# Patient Record
Sex: FEMALE | Race: WHITE | NOT HISPANIC OR LATINO | Employment: OTHER | URBAN - NONMETROPOLITAN AREA
[De-identification: names, ages, dates, MRNs, and addresses within clinical notes are randomized per-mention and may not be internally consistent; named-entity substitution may affect disease eponyms.]

---

## 2023-08-02 ENCOUNTER — OFFICE VISIT (OUTPATIENT)
Dept: PRIMARY CARE | Facility: CLINIC | Age: 73
End: 2023-08-02
Payer: MEDICARE

## 2023-08-02 VITALS
HEIGHT: 65 IN | BODY MASS INDEX: 30.82 KG/M2 | TEMPERATURE: 97 F | DIASTOLIC BLOOD PRESSURE: 72 MMHG | HEART RATE: 116 BPM | WEIGHT: 185 LBS | SYSTOLIC BLOOD PRESSURE: 126 MMHG | OXYGEN SATURATION: 98 %

## 2023-08-02 DIAGNOSIS — Z00.00 ROUTINE GENERAL MEDICAL EXAMINATION AT HEALTH CARE FACILITY: Primary | ICD-10-CM

## 2023-08-02 DIAGNOSIS — E03.9 HYPOTHYROIDISM, UNSPECIFIED TYPE: ICD-10-CM

## 2023-08-02 PROCEDURE — 1170F FXNL STATUS ASSESSED: CPT | Performed by: FAMILY MEDICINE

## 2023-08-02 PROCEDURE — 84443 ASSAY THYROID STIM HORMONE: CPT

## 2023-08-02 PROCEDURE — 1036F TOBACCO NON-USER: CPT | Performed by: FAMILY MEDICINE

## 2023-08-02 PROCEDURE — G0439 PPPS, SUBSEQ VISIT: HCPCS | Performed by: FAMILY MEDICINE

## 2023-08-02 PROCEDURE — 1159F MED LIST DOCD IN RCRD: CPT | Performed by: FAMILY MEDICINE

## 2023-08-02 ASSESSMENT — ACTIVITIES OF DAILY LIVING (ADL)
MANAGING_FINANCES: INDEPENDENT
BATHING: INDEPENDENT
GROCERY_SHOPPING: INDEPENDENT
TAKING_MEDICATION: INDEPENDENT
DOING_HOUSEWORK: INDEPENDENT
DRESSING: INDEPENDENT

## 2023-08-02 ASSESSMENT — ENCOUNTER SYMPTOMS
LOSS OF SENSATION IN FEET: 0
CONSTIPATION: 0
JOINT SWELLING: 0
SORE THROAT: 0
DEPRESSION: 0
RHINORRHEA: 0
EYE ITCHING: 0
OCCASIONAL FEELINGS OF UNSTEADINESS: 0
ABDOMINAL PAIN: 0
NUMBNESS: 0
VOMITING: 0
WHEEZING: 0
BLOOD IN STOOL: 0
DIARRHEA: 0
FLANK PAIN: 0
UNEXPECTED WEIGHT CHANGE: 0
EYE DISCHARGE: 0
PALPITATIONS: 0
MYALGIAS: 0
COUGH: 0
DYSURIA: 0
LIGHT-HEADEDNESS: 0
DYSPHORIC MOOD: 0
SLEEP DISTURBANCE: 0
APPETITE CHANGE: 0
NERVOUS/ANXIOUS: 0
DIZZINESS: 0
HEADACHES: 0
SINUS PRESSURE: 0
SHORTNESS OF BREATH: 0
NAUSEA: 0
ARTHRALGIAS: 0
HEMATURIA: 0
ACTIVITY CHANGE: 0
WEAKNESS: 0
FEVER: 0

## 2023-08-02 ASSESSMENT — PATIENT HEALTH QUESTIONNAIRE - PHQ9
SUM OF ALL RESPONSES TO PHQ9 QUESTIONS 1 AND 2: 0
1. LITTLE INTEREST OR PLEASURE IN DOING THINGS: NOT AT ALL
2. FEELING DOWN, DEPRESSED OR HOPELESS: NOT AT ALL

## 2023-08-02 ASSESSMENT — VISUAL ACUITY
OS_CC: 20/40
OD_CC: 20/20

## 2023-08-02 NOTE — PROGRESS NOTES
"Subjective   Reason for Visit: Joan M. Shoenberger is an 72 y.o. female here for a Medicare Wellness visit.     Past Medical, Surgical, and Family History reviewed and updated in chart.    Reviewed all medications by prescribing practitioner or clinical pharmacist (such as prescriptions, OTCs, herbal therapies and supplements) and documented in the medical record.    HPI FEELS WELL MOST DAYS     Patient Care Team:  Dahlia Silva DO as PCP - General (Family Medicine)  Yaya Herzog MD as PCP - [employer] PCP     Review of Systems   Constitutional:  Negative for activity change, appetite change, fever and unexpected weight change.   HENT:  Negative for congestion, ear pain, postnasal drip, rhinorrhea, sinus pressure and sore throat.    Eyes:  Negative for discharge, itching and visual disturbance.   Respiratory:  Negative for cough, shortness of breath and wheezing.    Cardiovascular:  Negative for chest pain, palpitations and leg swelling.   Gastrointestinal:  Negative for abdominal pain, blood in stool, constipation, diarrhea, nausea and vomiting.   Endocrine: Negative for cold intolerance, heat intolerance and polyuria.   Genitourinary:  Negative for dysuria, flank pain and hematuria.   Musculoskeletal:  Negative for arthralgias, gait problem, joint swelling and myalgias.   Skin:  Negative for rash.   Allergic/Immunologic: Negative for environmental allergies and food allergies.   Neurological:  Negative for dizziness, syncope, weakness, light-headedness, numbness and headaches.   Psychiatric/Behavioral:  Negative for dysphoric mood and sleep disturbance. The patient is not nervous/anxious.        Objective   Vitals:  /72   Pulse (!) 116   Temp 36.1 °C (97 °F)   Ht 1.638 m (5' 4.5\")   Wt 83.9 kg (185 lb)   SpO2 98%   BMI 31.26 kg/m²       Physical Exam  Constitutional:       Appearance: Normal appearance.   HENT:      Head: Normocephalic and atraumatic.      Nose: Nose normal.      " Mouth/Throat:      Mouth: Mucous membranes are moist.   Eyes:      Extraocular Movements: Extraocular movements intact.      Pupils: Pupils are equal, round, and reactive to light.   Cardiovascular:      Rate and Rhythm: Normal rate and regular rhythm.   Pulmonary:      Effort: Pulmonary effort is normal.      Breath sounds: Normal breath sounds.   Abdominal:      Palpations: Abdomen is soft.   Musculoskeletal:         General: Normal range of motion.      Cervical back: Normal range of motion and neck supple.   Skin:     General: Skin is warm and dry.   Neurological:      General: No focal deficit present.      Mental Status: She is alert and oriented to person, place, and time.   Psychiatric:         Mood and Affect: Mood normal.         Behavior: Behavior normal.         Assessment/Plan   Problem List Items Addressed This Visit    None  Visit Diagnoses       Routine general medical examination at health care facility    -  Primary    Hypothyroidism, unspecified type        Relevant Orders    Tsh With Reflex To Free T4 If Abnormal

## 2023-08-02 NOTE — PATIENT INSTRUCTIONS
Get the flu vaccine and the pneumonia vaccine this September   Labs taken and the office will call

## 2023-08-02 NOTE — PROGRESS NOTES
"Subjective   Patient ID: Joan M. Shoenberger is a 72 y.o. female who presents for Medicare Annual Wellness Visit Subsequent (Lab review).    HPI     Review of Systems    Objective   /72   Pulse (!) 116   Temp 36.1 °C (97 °F)   Ht 1.638 m (5' 4.5\")   Wt 83.9 kg (185 lb)   SpO2 98%   BMI 31.26 kg/m²     Physical Exam    Assessment/Plan          "

## 2023-08-03 ENCOUNTER — TELEPHONE (OUTPATIENT)
Dept: PRIMARY CARE | Facility: CLINIC | Age: 73
End: 2023-08-03
Payer: MEDICARE

## 2023-08-03 LAB — THYROTROPIN (MIU/L) IN SER/PLAS BY DETECTION LIMIT <= 0.05 MIU/L: 0.7 MIU/L (ref 0.44–3.98)

## 2023-08-03 NOTE — TELEPHONE ENCOUNTER
Fatimah has left a VM stating that she is returning a call.  I have left a message requesting a call back to the office

## 2023-08-03 NOTE — TELEPHONE ENCOUNTER
----- Message from Dahlia Silva DO sent at 8/3/2023 11:33 AM EDT -----  Thyroid tests are normal in range   Refill her medication

## 2023-08-17 DIAGNOSIS — Z86.39 PERSONAL HISTORY OF OTHER ENDOCRINE, NUTRITIONAL AND METABOLIC DISEASE: ICD-10-CM

## 2023-08-17 RX ORDER — LEVOTHYROXINE SODIUM 75 UG/1
75 TABLET ORAL DAILY
Qty: 90 TABLET | Refills: 3 | Status: SHIPPED | OUTPATIENT
Start: 2023-08-17

## 2023-09-06 ENCOUNTER — APPOINTMENT (OUTPATIENT)
Dept: PRIMARY CARE | Facility: CLINIC | Age: 73
End: 2023-09-06
Payer: MEDICARE

## 2023-12-09 ENCOUNTER — HOSPITAL ENCOUNTER (EMERGENCY)
Facility: HOSPITAL | Age: 73
Discharge: HOME | End: 2023-12-09
Attending: EMERGENCY MEDICINE
Payer: MEDICARE

## 2023-12-09 ENCOUNTER — APPOINTMENT (OUTPATIENT)
Dept: RADIOLOGY | Facility: HOSPITAL | Age: 73
End: 2023-12-09
Payer: MEDICARE

## 2023-12-09 VITALS
HEART RATE: 83 BPM | TEMPERATURE: 97.7 F | BODY MASS INDEX: 27 KG/M2 | WEIGHT: 168 LBS | SYSTOLIC BLOOD PRESSURE: 143 MMHG | HEIGHT: 66 IN | RESPIRATION RATE: 16 BRPM | DIASTOLIC BLOOD PRESSURE: 72 MMHG | OXYGEN SATURATION: 98 %

## 2023-12-09 DIAGNOSIS — N20.1 URETERAL STONE: ICD-10-CM

## 2023-12-09 DIAGNOSIS — N39.0 UTI (URINARY TRACT INFECTION), UNCOMPLICATED: Primary | ICD-10-CM

## 2023-12-09 LAB
ALBUMIN SERPL BCP-MCNC: 3.7 G/DL (ref 3.4–5)
ALP SERPL-CCNC: 60 U/L (ref 33–136)
ALT SERPL W P-5'-P-CCNC: 12 U/L (ref 7–45)
ANION GAP SERPL CALC-SCNC: 10 MMOL/L (ref 10–20)
APPEARANCE UR: CLEAR
AST SERPL W P-5'-P-CCNC: 18 U/L (ref 9–39)
BASOPHILS # BLD AUTO: 0.05 X10*3/UL (ref 0–0.1)
BASOPHILS NFR BLD AUTO: 0.7 %
BILIRUB DIRECT SERPL-MCNC: 0 MG/DL (ref 0–0.3)
BILIRUB SERPL-MCNC: 0.2 MG/DL (ref 0–1.2)
BILIRUB UR STRIP.AUTO-MCNC: NEGATIVE MG/DL
BUN SERPL-MCNC: 24 MG/DL (ref 6–23)
CALCIUM SERPL-MCNC: 8.8 MG/DL (ref 8.6–10.3)
CHLORIDE SERPL-SCNC: 104 MMOL/L (ref 98–107)
CO2 SERPL-SCNC: 26 MMOL/L (ref 21–32)
COLOR UR: YELLOW
CREAT SERPL-MCNC: 0.97 MG/DL (ref 0.5–1.05)
EOSINOPHIL # BLD AUTO: 0.15 X10*3/UL (ref 0–0.4)
EOSINOPHIL NFR BLD AUTO: 2.1 %
ERYTHROCYTE [DISTWIDTH] IN BLOOD BY AUTOMATED COUNT: 12.5 % (ref 11.5–14.5)
GFR SERPL CREATININE-BSD FRML MDRD: 61 ML/MIN/1.73M*2
GLUCOSE SERPL-MCNC: 118 MG/DL (ref 74–99)
GLUCOSE UR STRIP.AUTO-MCNC: NEGATIVE MG/DL
HCT VFR BLD AUTO: 39.8 % (ref 36–46)
HGB BLD-MCNC: 13.1 G/DL (ref 12–16)
IMM GRANULOCYTES # BLD AUTO: 0.02 X10*3/UL (ref 0–0.5)
IMM GRANULOCYTES NFR BLD AUTO: 0.3 % (ref 0–0.9)
INR PPP: 1.1 (ref 0.9–1.1)
KETONES UR STRIP.AUTO-MCNC: NEGATIVE MG/DL
LACTATE SERPL-SCNC: 1.1 MMOL/L (ref 0.4–2)
LEUKOCYTE ESTERASE UR QL STRIP.AUTO: ABNORMAL
LYMPHOCYTES # BLD AUTO: 2.37 X10*3/UL (ref 0.8–3)
LYMPHOCYTES NFR BLD AUTO: 32.8 %
MCH RBC QN AUTO: 31.5 PG (ref 26–34)
MCHC RBC AUTO-ENTMCNC: 32.9 G/DL (ref 32–36)
MCV RBC AUTO: 96 FL (ref 80–100)
MONOCYTES # BLD AUTO: 0.57 X10*3/UL (ref 0.05–0.8)
MONOCYTES NFR BLD AUTO: 7.9 %
MUCOUS THREADS #/AREA URNS AUTO: ABNORMAL /LPF
NEUTROPHILS # BLD AUTO: 4.07 X10*3/UL (ref 1.6–5.5)
NEUTROPHILS NFR BLD AUTO: 56.2 %
NITRITE UR QL STRIP.AUTO: NEGATIVE
NRBC BLD-RTO: 0 /100 WBCS (ref 0–0)
PH UR STRIP.AUTO: 5 [PH]
PLATELET # BLD AUTO: 186 X10*3/UL (ref 150–450)
POTASSIUM SERPL-SCNC: 4 MMOL/L (ref 3.5–5.3)
PROT SERPL-MCNC: 6.2 G/DL (ref 6.4–8.2)
PROT UR STRIP.AUTO-MCNC: ABNORMAL MG/DL
PROTHROMBIN TIME: 12.2 SECONDS (ref 9.8–12.8)
RBC # BLD AUTO: 4.16 X10*6/UL (ref 4–5.2)
RBC # UR STRIP.AUTO: ABNORMAL /UL
RBC #/AREA URNS AUTO: ABNORMAL /HPF
SODIUM SERPL-SCNC: 136 MMOL/L (ref 136–145)
SP GR UR STRIP.AUTO: 1.02
UROBILINOGEN UR STRIP.AUTO-MCNC: <2 MG/DL
WBC # BLD AUTO: 7.2 X10*3/UL (ref 4.4–11.3)
WBC #/AREA URNS AUTO: ABNORMAL /HPF

## 2023-12-09 PROCEDURE — 36415 COLL VENOUS BLD VENIPUNCTURE: CPT | Performed by: EMERGENCY MEDICINE

## 2023-12-09 PROCEDURE — 85025 COMPLETE CBC W/AUTO DIFF WBC: CPT | Performed by: EMERGENCY MEDICINE

## 2023-12-09 PROCEDURE — 81001 URINALYSIS AUTO W/SCOPE: CPT | Performed by: EMERGENCY MEDICINE

## 2023-12-09 PROCEDURE — 87086 URINE CULTURE/COLONY COUNT: CPT | Mod: CONLAB | Performed by: EMERGENCY MEDICINE

## 2023-12-09 PROCEDURE — 74176 CT ABD & PELVIS W/O CONTRAST: CPT | Performed by: RADIOLOGY

## 2023-12-09 PROCEDURE — 96365 THER/PROPH/DIAG IV INF INIT: CPT

## 2023-12-09 PROCEDURE — 85610 PROTHROMBIN TIME: CPT | Performed by: EMERGENCY MEDICINE

## 2023-12-09 PROCEDURE — 83605 ASSAY OF LACTIC ACID: CPT | Performed by: EMERGENCY MEDICINE

## 2023-12-09 PROCEDURE — 74176 CT ABD & PELVIS W/O CONTRAST: CPT

## 2023-12-09 PROCEDURE — 2500000004 HC RX 250 GENERAL PHARMACY W/ HCPCS (ALT 636 FOR OP/ED): Performed by: EMERGENCY MEDICINE

## 2023-12-09 PROCEDURE — 84075 ASSAY ALKALINE PHOSPHATASE: CPT | Performed by: EMERGENCY MEDICINE

## 2023-12-09 PROCEDURE — 99284 EMERGENCY DEPT VISIT MOD MDM: CPT | Performed by: EMERGENCY MEDICINE

## 2023-12-09 PROCEDURE — 80048 BASIC METABOLIC PNL TOTAL CA: CPT | Performed by: EMERGENCY MEDICINE

## 2023-12-09 RX ORDER — CEFTRIAXONE 1 G/50ML
1 INJECTION, SOLUTION INTRAVENOUS ONCE
Status: COMPLETED | OUTPATIENT
Start: 2023-12-09 | End: 2023-12-09

## 2023-12-09 RX ORDER — CEPHALEXIN 500 MG/1
500 CAPSULE ORAL 4 TIMES DAILY
Qty: 28 CAPSULE | Refills: 0 | Status: SHIPPED | OUTPATIENT
Start: 2023-12-09 | End: 2023-12-16

## 2023-12-09 RX ORDER — TAMSULOSIN HYDROCHLORIDE 0.4 MG/1
0.4 CAPSULE ORAL DAILY
Qty: 10 CAPSULE | Refills: 0 | Status: SHIPPED | OUTPATIENT
Start: 2023-12-09 | End: 2023-12-19

## 2023-12-09 RX ADMIN — CEFTRIAXONE 1 G: 1 INJECTION, SOLUTION INTRAVENOUS at 20:44

## 2023-12-09 RX ADMIN — SODIUM CHLORIDE 1000 ML: 9 INJECTION, SOLUTION INTRAVENOUS at 19:20

## 2023-12-09 ASSESSMENT — PAIN SCALES - GENERAL
PAINLEVEL_OUTOF10: 0 - NO PAIN
PAINLEVEL_OUTOF10: 6
PAINLEVEL_OUTOF10: 0 - NO PAIN
PAINLEVEL_OUTOF10: 0 - NO PAIN

## 2023-12-09 ASSESSMENT — PAIN - FUNCTIONAL ASSESSMENT: PAIN_FUNCTIONAL_ASSESSMENT: 0-10

## 2023-12-09 ASSESSMENT — COLUMBIA-SUICIDE SEVERITY RATING SCALE - C-SSRS
6. HAVE YOU EVER DONE ANYTHING, STARTED TO DO ANYTHING, OR PREPARED TO DO ANYTHING TO END YOUR LIFE?: NO
1. IN THE PAST MONTH, HAVE YOU WISHED YOU WERE DEAD OR WISHED YOU COULD GO TO SLEEP AND NOT WAKE UP?: NO
2. HAVE YOU ACTUALLY HAD ANY THOUGHTS OF KILLING YOURSELF?: NO

## 2023-12-11 LAB — BACTERIA UR CULT: NO GROWTH

## 2023-12-26 ENCOUNTER — HOSPITAL ENCOUNTER (OUTPATIENT)
Facility: HOSPITAL | Age: 73
Setting detail: OUTPATIENT SURGERY
End: 2023-12-26
Attending: UROLOGY | Admitting: UROLOGY
Payer: MEDICARE

## 2023-12-26 ENCOUNTER — ANCILLARY PROCEDURE (OUTPATIENT)
Dept: RADIOLOGY | Facility: CLINIC | Age: 73
End: 2023-12-26
Payer: MEDICARE

## 2023-12-26 DIAGNOSIS — N20.0 CALCULUS OF KIDNEY: ICD-10-CM

## 2023-12-26 PROCEDURE — 74018 RADEX ABDOMEN 1 VIEW: CPT

## 2023-12-26 PROCEDURE — 74018 RADEX ABDOMEN 1 VIEW: CPT | Performed by: RADIOLOGY

## 2023-12-27 ENCOUNTER — ANESTHESIA EVENT (OUTPATIENT)
Dept: OPERATING ROOM | Facility: HOSPITAL | Age: 73
End: 2023-12-27

## 2023-12-27 ENCOUNTER — ANESTHESIA (OUTPATIENT)
Dept: OPERATING ROOM | Facility: HOSPITAL | Age: 73
End: 2023-12-27
Payer: MEDICARE

## 2024-01-06 NOTE — ED PROVIDER NOTES
Department of Emergency Medicine   ED  Provider Note  Admit Date/RoomTime: 12/9/2023  6:22 PM  [facility]                  History of Present Illness:   Joan M. Shoenberger is a 73 y.o. female presenting to the ED for having complaint of right flank pain.  Patient states that she has had prior history of kidney stones and that this discomfort feels similar to having a kidney stone before.  Patient also does have prior history of several chronic conditions including history of hypertension depression in addition to having chronic low back pain.  Patient endorsed having some nausea denies any vomiting.  Patient denies any alcohol use.  Patient denies any recreational drug use.  Denies any diarrhea or constipation.  Denies any hematuria    Review of Systems:   Pertinent positives and review of systems as noted above.  Remaining 10 review of systems is negative or noncontributory to today's episode of care.        --------------------------------------------- PAST HISTORY ---------------------------------------------  Past Medical History:  has a past medical history of Calculus of left kidney (08/16/2021), Depression with anxiety, BRAND (dyspnea on exertion), Essential (primary) hypertension (05/26/2020), low back pain, Personal history of other diseases of urinary system (10/06/2021), Personal history of other diseases of urinary system (10/15/2020), Personal history of other endocrine, nutritional and metabolic disease, and Right ureteral stone.    Past Surgical History:  has a past surgical history that includes Other surgical history (10/15/2020); Other surgical history (10/15/2020); Other surgical history (10/15/2020); Other surgical history (10/15/2020); and Other surgical history (10/15/2020).    Social History:  reports that she has never smoked. She has never used smokeless tobacco. She reports that she does not currently use alcohol. She reports that she does not use drugs.    Family History: family  history includes Heart disease in her father and mother. Unless otherwise noted, family history is non contributory    The patient’s home medications have been reviewed.    Allergies: Famotidine, Codeine, and Penicillin    -------------------------------------------------- RESULTS -------------------------------------------------  All laboratory and radiology results have been personally reviewed by myself   LABS:  Labs Reviewed   BASIC METABOLIC PANEL - Abnormal       Result Value    Glucose 118 (*)     Sodium 136      Potassium 4.0      Chloride 104      Bicarbonate 26      Anion Gap 10      Urea Nitrogen 24 (*)     Creatinine 0.97      eGFR 61      Calcium 8.8     HEPATIC FUNCTION PANEL - Abnormal    Albumin 3.7      Bilirubin, Total 0.2      Bilirubin, Direct 0.0      Alkaline Phosphatase 60      ALT 12      AST 18      Total Protein 6.2 (*)    URINALYSIS WITH REFLEX MICROSCOPIC - Abnormal    Color, Urine Yellow      Appearance, Urine Clear      Specific Gravity, Urine 1.019      pH, Urine 5.0      Protein, Urine 30 (1+) (*)     Glucose, Urine NEGATIVE      Blood, Urine MODERATE (2+) (*)     Ketones, Urine NEGATIVE      Bilirubin, Urine NEGATIVE      Urobilinogen, Urine <2.0      Nitrite, Urine NEGATIVE      Leukocyte Esterase, Urine MODERATE (2+) (*)    MICROSCOPIC ONLY, URINE - Abnormal    WBC, Urine 11-20 (*)     RBC, Urine 3-5      Mucus, Urine 1+     URINE CULTURE - Normal    Urine Culture No growth     LACTATE - Normal    Lactate 1.1      Narrative:     Venipuncture immediately after or during the administration of Metamizole may lead to falsely low results. Testing should be performed immediately  prior to Metamizole dosing.   PROTIME-INR - Normal    Protime 12.2      INR 1.1     CBC WITH AUTO DIFFERENTIAL    WBC 7.2      nRBC 0.0      RBC 4.16      Hemoglobin 13.1      Hematocrit 39.8      MCV 96      MCH 31.5      MCHC 32.9      RDW 12.5      Platelets 186      Neutrophils % 56.2      Immature  "Granulocytes %, Automated 0.3      Lymphocytes % 32.8      Monocytes % 7.9      Eosinophils % 2.1      Basophils % 0.7      Neutrophils Absolute 4.07      Immature Granulocytes Absolute, Automated 0.02      Lymphocytes Absolute 2.37      Monocytes Absolute 0.57      Eosinophils Absolute 0.15      Basophils Absolute 0.05           RADIOLOGY:  Interpreted by Radiologist.  CT abdomen pelvis wo IV contrast   Final Result   There is a 4 x 6 x 7mm calculus in the right proximal ureter without   significant hydronephrosis or perinephric inflammatory changes.        Atrophic left kidney. There is a large staghorn type calculus in the   lower pole of the left kidney measuring 1.9 x 1.4 cm. There is   adjacent cortical atrophy with parenchymal calcification.  A 1.6 cm   hypodense lesion in the upper pole of the left kidney demonstrates   fluid attenuation, incompletely characterized but stable.        Scattered colonic diverticula without evidence for acute   diverticulitis.        Additional findings as described above.        MACRO:   None        Signed by: Ani Mejia 12/9/2023 7:14 PM   Dictation workstation:   [id number]          No results found for this or any previous visit (from the past 4464 hour(s)).  ------------------------- NURSING NOTES AND VITALS REVIEWED ---------------------------   The nursing notes within the ED encounter and vital signs as below have been reviewed.   /72 (BP Location: Right arm, Patient Position: Sitting)   Pulse 83   Temp 36.5 °C (97.7 °F) (Temporal)   Resp 16   Ht 1.676 m (5' 6\")   Wt 76.2 kg (168 lb)   SpO2 98%   BMI 27.12 kg/m²   Oxygen Saturation Interpretation: Normal      ---------------------------------------------------PHYSICAL EXAM--------------------------------------    Constitutional/General: Alert and oriented x3, well appearing, non toxic in NAD  Head: Normocephalic and atraumatic  Eyes: PERRL, EOMI, conjunctiva normal, sclera non icteric  Mouth: " Oropharynx clear, handling secretions, no trismus, no asymmetry of the posterior oropharynx or uvular edema  Neck: Supple, full ROM, non tender to palpation in the midline, no stridor, no crepitus, no meningeal signs  Respiratory: Lungs clear to auscultation bilaterally, no wheezes, rales, or rhonchi. Not in respiratory distress  Cardiovascular:  Regular rate. Regular rhythm. No murmurs, gallops, or rubs. 2+ distal pulses  Chest: No chest wall tenderness  GI:  Abdomen Soft, Non tender, Non distended.  +BS. No organomegaly, no palpable masses,  No rebound, guarding, or rigidity.   Musculoskeletal: Moves all extremities x 4. Warm and well perfused, no clubbing, cyanosis, or edema. Capillary refill <3 seconds  Integument: skin warm and dry. No rashes.   Lymphatic: no lymphadenopathy noted  Neurologic: GCS 15, no focal deficits, symmetric strength 5/5 in the upper and lower extremities bilaterally  Psychiatric: Normal Affect    Procedures    ------------------------------ ED COURSE/MEDICAL DECISION MAKING----------------------    Medical Decision Making:   Patient was seen and examined in the ER.  Patient does have some mild CVA tenderness without any significant rebound.  Patient does not any acute abnormality on the rest of her examination her abdomen is soft and nontender.  While patient mentation is within normal limits.  Patient was provided intravenous fluid hydration.  Patient CT does reveal an obstructive stone on the right side as well as staghorn calculus on the left side.  Patient was provided with intravenous IV fluid in addition to antibiotics.  Patient was offered admission however she states so she would prefer for further outpatient treatment.  She was provided with medications for treatment of her urinary tract infection as well as for home-going supportive treatment for having a kidney stone.  She is also given follow-up outpatient.  Diagnoses as of 01/05/24 1901   UTI (urinary tract infection),  uncomplicated   Ureteral stone      Counseling:   The emergency provider has spoken with the patient and discussed today’s results, in addition to providing specific details for the plan of care and counseling regarding the diagnosis and prognosis.  Questions are answered at this time and they are agreeable with the plan.      --------------------------------- IMPRESSION AND DISPOSITION ---------------------------------    Diagnoses as of 01/05/24 1901   UTI (urinary tract infection), uncomplicated   Ureteral stone        IMPRESSION  1. UTI (urinary tract infection), uncomplicated    2. Ureteral stone        DISPOSITION  Disposition: Discharge to home  Patient condition is fair      Billing Provider Critical Care Time: NONE     Clarence Powell DO  01/05/24 1904

## 2024-01-08 ENCOUNTER — ANESTHESIA EVENT (OUTPATIENT)
Dept: OPERATING ROOM | Facility: HOSPITAL | Age: 74
End: 2024-01-08
Payer: MEDICARE

## 2024-01-09 RX ORDER — SODIUM CHLORIDE, SODIUM LACTATE, POTASSIUM CHLORIDE, CALCIUM CHLORIDE 600; 310; 30; 20 MG/100ML; MG/100ML; MG/100ML; MG/100ML
100 INJECTION, SOLUTION INTRAVENOUS CONTINUOUS
Status: CANCELLED | OUTPATIENT
Start: 2024-01-09

## 2024-01-09 RX ORDER — DROPERIDOL 2.5 MG/ML
0.62 INJECTION, SOLUTION INTRAMUSCULAR; INTRAVENOUS ONCE AS NEEDED
Status: CANCELLED | OUTPATIENT
Start: 2024-01-09

## 2024-01-09 RX ORDER — ONDANSETRON HYDROCHLORIDE 2 MG/ML
4 INJECTION, SOLUTION INTRAVENOUS ONCE AS NEEDED
Status: CANCELLED | OUTPATIENT
Start: 2024-01-09

## 2024-01-09 RX ORDER — OXYCODONE HYDROCHLORIDE 5 MG/1
5 TABLET ORAL EVERY 4 HOURS PRN
Status: CANCELLED | OUTPATIENT
Start: 2024-01-09

## 2024-01-10 ENCOUNTER — APPOINTMENT (OUTPATIENT)
Dept: RADIOLOGY | Facility: HOSPITAL | Age: 74
End: 2024-01-10
Payer: MEDICARE

## 2024-01-10 ENCOUNTER — ANESTHESIA (OUTPATIENT)
Dept: OPERATING ROOM | Facility: HOSPITAL | Age: 74
End: 2024-01-10
Payer: MEDICARE

## 2024-01-10 ENCOUNTER — HOSPITAL ENCOUNTER (OUTPATIENT)
Facility: HOSPITAL | Age: 74
Setting detail: OUTPATIENT SURGERY
Discharge: HOME | End: 2024-01-10
Attending: UROLOGY | Admitting: UROLOGY
Payer: MEDICARE

## 2024-01-10 VITALS
BODY MASS INDEX: 30.44 KG/M2 | RESPIRATION RATE: 12 BRPM | WEIGHT: 189.38 LBS | HEIGHT: 66 IN | SYSTOLIC BLOOD PRESSURE: 94 MMHG | DIASTOLIC BLOOD PRESSURE: 60 MMHG | OXYGEN SATURATION: 100 % | HEART RATE: 71 BPM | TEMPERATURE: 97.3 F

## 2024-01-10 DIAGNOSIS — N20.1 CALCULUS OF URETER: ICD-10-CM

## 2024-01-10 PROCEDURE — 2500000004 HC RX 250 GENERAL PHARMACY W/ HCPCS (ALT 636 FOR OP/ED): Performed by: ANESTHESIOLOGY

## 2024-01-10 PROCEDURE — 87086 URINE CULTURE/COLONY COUNT: CPT | Mod: GEALAB | Performed by: UROLOGY

## 2024-01-10 PROCEDURE — 2780000003 HC OR 278 NO HCPCS: Performed by: UROLOGY

## 2024-01-10 PROCEDURE — 82365 CALCULUS SPECTROSCOPY: CPT | Performed by: UROLOGY

## 2024-01-10 PROCEDURE — C1758 CATHETER, URETERAL: HCPCS | Performed by: UROLOGY

## 2024-01-10 PROCEDURE — 7100000002 HC RECOVERY ROOM TIME - EACH INCREMENTAL 1 MINUTE: Performed by: UROLOGY

## 2024-01-10 PROCEDURE — 7100000010 HC PHASE TWO TIME - EACH INCREMENTAL 1 MINUTE: Performed by: UROLOGY

## 2024-01-10 PROCEDURE — 3700000002 HC GENERAL ANESTHESIA TIME - EACH INCREMENTAL 1 MINUTE: Performed by: UROLOGY

## 2024-01-10 PROCEDURE — 2500000005 HC RX 250 GENERAL PHARMACY W/O HCPCS: Performed by: NURSE ANESTHETIST, CERTIFIED REGISTERED

## 2024-01-10 PROCEDURE — A52356 PR CYSTO/URETERO W/LITHOTRIPSY  AND INDWELL STENT INSRT: Performed by: NURSE ANESTHETIST, CERTIFIED REGISTERED

## 2024-01-10 PROCEDURE — 3600000003 HC OR TIME - INITIAL BASE CHARGE - PROCEDURE LEVEL THREE: Performed by: UROLOGY

## 2024-01-10 PROCEDURE — 2720000007 HC OR 272 NO HCPCS: Performed by: UROLOGY

## 2024-01-10 PROCEDURE — 2500000004 HC RX 250 GENERAL PHARMACY W/ HCPCS (ALT 636 FOR OP/ED): Performed by: UROLOGY

## 2024-01-10 PROCEDURE — 3600000008 HC OR TIME - EACH INCREMENTAL 1 MINUTE - PROCEDURE LEVEL THREE: Performed by: UROLOGY

## 2024-01-10 PROCEDURE — 2500000004 HC RX 250 GENERAL PHARMACY W/ HCPCS (ALT 636 FOR OP/ED): Performed by: NURSE ANESTHETIST, CERTIFIED REGISTERED

## 2024-01-10 PROCEDURE — 3700000001 HC GENERAL ANESTHESIA TIME - INITIAL BASE CHARGE: Performed by: UROLOGY

## 2024-01-10 PROCEDURE — A4217 STERILE WATER/SALINE, 500 ML: HCPCS | Performed by: UROLOGY

## 2024-01-10 PROCEDURE — 76000 FLUOROSCOPY <1 HR PHYS/QHP: CPT | Mod: 59

## 2024-01-10 PROCEDURE — C2617 STENT, NON-COR, TEM W/O DEL: HCPCS | Performed by: UROLOGY

## 2024-01-10 PROCEDURE — 7100000009 HC PHASE TWO TIME - INITIAL BASE CHARGE: Performed by: UROLOGY

## 2024-01-10 PROCEDURE — 7100000001 HC RECOVERY ROOM TIME - INITIAL BASE CHARGE: Performed by: UROLOGY

## 2024-01-10 PROCEDURE — C1894 INTRO/SHEATH, NON-LASER: HCPCS | Performed by: UROLOGY

## 2024-01-10 DEVICE — STENT, INLAY OPTIMA, 6FR X 26CM: Type: IMPLANTABLE DEVICE | Site: URETER | Status: FUNCTIONAL

## 2024-01-10 RX ORDER — PROPOFOL 10 MG/ML
INJECTION, EMULSION INTRAVENOUS AS NEEDED
Status: DISCONTINUED | OUTPATIENT
Start: 2024-01-10 | End: 2024-01-10

## 2024-01-10 RX ORDER — CEFAZOLIN SODIUM 2 G/100ML
2 INJECTION, SOLUTION INTRAVENOUS ONCE
Status: CANCELLED | OUTPATIENT
Start: 2024-01-10 | End: 2024-01-10

## 2024-01-10 RX ORDER — CEFAZOLIN 1 G/1
INJECTION, POWDER, FOR SOLUTION INTRAVENOUS AS NEEDED
Status: DISCONTINUED | OUTPATIENT
Start: 2024-01-10 | End: 2024-01-10

## 2024-01-10 RX ORDER — SODIUM CHLORIDE, SODIUM LACTATE, POTASSIUM CHLORIDE, CALCIUM CHLORIDE 600; 310; 30; 20 MG/100ML; MG/100ML; MG/100ML; MG/100ML
100 INJECTION, SOLUTION INTRAVENOUS CONTINUOUS
Status: DISCONTINUED | OUTPATIENT
Start: 2024-01-10 | End: 2024-01-11 | Stop reason: HOSPADM

## 2024-01-10 RX ORDER — SODIUM CHLORIDE 0.9 G/100ML
IRRIGANT IRRIGATION AS NEEDED
Status: DISCONTINUED | OUTPATIENT
Start: 2024-01-10 | End: 2024-01-10 | Stop reason: HOSPADM

## 2024-01-10 RX ORDER — FENTANYL CITRATE 50 UG/ML
INJECTION, SOLUTION INTRAMUSCULAR; INTRAVENOUS AS NEEDED
Status: DISCONTINUED | OUTPATIENT
Start: 2024-01-10 | End: 2024-01-10

## 2024-01-10 RX ORDER — LIDOCAINE HCL/PF 100 MG/5ML
SYRINGE (ML) INTRAVENOUS AS NEEDED
Status: DISCONTINUED | OUTPATIENT
Start: 2024-01-10 | End: 2024-01-10

## 2024-01-10 RX ORDER — ONDANSETRON HYDROCHLORIDE 2 MG/ML
INJECTION, SOLUTION INTRAVENOUS AS NEEDED
Status: DISCONTINUED | OUTPATIENT
Start: 2024-01-10 | End: 2024-01-10

## 2024-01-10 RX ADMIN — SODIUM CHLORIDE, POTASSIUM CHLORIDE, SODIUM LACTATE AND CALCIUM CHLORIDE 100 ML/HR: 600; 310; 30; 20 INJECTION, SOLUTION INTRAVENOUS at 07:41

## 2024-01-10 RX ADMIN — PROPOFOL 140 MG: 10 INJECTION, EMULSION INTRAVENOUS at 08:51

## 2024-01-10 RX ADMIN — PROPOFOL 30 MG: 10 INJECTION, EMULSION INTRAVENOUS at 09:01

## 2024-01-10 RX ADMIN — FENTANYL CITRATE 25 MCG: 50 INJECTION, SOLUTION INTRAMUSCULAR; INTRAVENOUS at 09:34

## 2024-01-10 RX ADMIN — ONDANSETRON 4 MG: 2 INJECTION INTRAMUSCULAR; INTRAVENOUS at 09:34

## 2024-01-10 RX ADMIN — PROPOFOL 30 MG: 10 INJECTION, EMULSION INTRAVENOUS at 09:30

## 2024-01-10 RX ADMIN — CEFAZOLIN 2 G: 1 INJECTION, POWDER, FOR SOLUTION INTRAMUSCULAR; INTRAVENOUS at 08:59

## 2024-01-10 RX ADMIN — FENTANYL CITRATE 25 MCG: 50 INJECTION, SOLUTION INTRAMUSCULAR; INTRAVENOUS at 09:26

## 2024-01-10 RX ADMIN — LIDOCAINE HYDROCHLORIDE 50 MG: 20 INJECTION INTRAVENOUS at 08:51

## 2024-01-10 RX ADMIN — FENTANYL CITRATE 50 MCG: 50 INJECTION, SOLUTION INTRAMUSCULAR; INTRAVENOUS at 09:00

## 2024-01-10 SDOH — HEALTH STABILITY: MENTAL HEALTH: CURRENT SMOKER: 0

## 2024-01-10 ASSESSMENT — PAIN SCALES - GENERAL
PAINLEVEL_OUTOF10: 0 - NO PAIN

## 2024-01-10 ASSESSMENT — PAIN - FUNCTIONAL ASSESSMENT
PAIN_FUNCTIONAL_ASSESSMENT: 0-10
PAIN_FUNCTIONAL_ASSESSMENT: 0-10

## 2024-01-10 ASSESSMENT — COLUMBIA-SUICIDE SEVERITY RATING SCALE - C-SSRS
2. HAVE YOU ACTUALLY HAD ANY THOUGHTS OF KILLING YOURSELF?: NO
1. IN THE PAST MONTH, HAVE YOU WISHED YOU WERE DEAD OR WISHED YOU COULD GO TO SLEEP AND NOT WAKE UP?: NO
1. IN THE PAST MONTH, HAVE YOU WISHED YOU WERE DEAD OR WISHED YOU COULD GO TO SLEEP AND NOT WAKE UP?: NO
6. HAVE YOU EVER DONE ANYTHING, STARTED TO DO ANYTHING, OR PREPARED TO DO ANYTHING TO END YOUR LIFE?: NO
2. HAVE YOU ACTUALLY HAD ANY THOUGHTS OF KILLING YOURSELF?: NO
6. HAVE YOU EVER DONE ANYTHING, STARTED TO DO ANYTHING, OR PREPARED TO DO ANYTHING TO END YOUR LIFE?: NO

## 2024-01-10 NOTE — H&P
"History Of Present Illness  Joan M. Shoenberger is a 73 y.o. female presenting with right ureteral stone.     Past Medical History  Past Medical History:   Diagnosis Date    Calculus of left kidney 08/16/2021    Large Stag Horn, on 12/09/2023 measures 1.9 x 1.4 cm    Class 1 obesity     Depression with anxiety     BRAND (dyspnea on exertion)     Essential (primary) hypertension 05/26/2020    Benign essential HTN    Hx of low back pain     Personal history of other diseases of urinary system 10/06/2021    History of hematuria    Personal history of other diseases of urinary system 10/15/2020    History of hematuria    Personal history of other endocrine, nutritional and metabolic disease     History of hyperthyroidism       Surgical History  Past Surgical History:   Procedure Laterality Date    OTHER SURGICAL HISTORY  10/15/2020    Hysterectomy    OTHER SURGICAL HISTORY  10/15/2020    Gallbladder surgery    OTHER SURGICAL HISTORY  10/15/2020    Appendectomy    OTHER SURGICAL HISTORY  10/15/2020    Cystocele repair    OTHER SURGICAL HISTORY  10/15/2020    Tonsillectomy with adenoidectomy        Social History  She reports that she has never smoked. She has never used smokeless tobacco. She reports that she does not currently use alcohol. She reports that she does not use drugs.    Family History  Family History   Problem Relation Name Age of Onset    Heart disease Mother      Heart disease Father          Allergies  Famotidine, Codeine, Penicillin, and Penicillins    Review of Systems     Physical Exam  Alert, oriented  Normal resp effort  Abdomen  soft, nontender     Last Recorded Vitals  Blood pressure 126/61, pulse 87, temperature 36.3 °C (97.3 °F), resp. rate 16, height 1.676 m (5' 6\"), weight 85.9 kg (189 lb 6 oz), SpO2 99 %.    Relevant Results        CT, labs reviewed     Assessment/Plan   73 year old ha s a 7 mm proximal right ureteral stone.Ureteroscopy and laser lithotripsy today.           Leland Warren, " MD

## 2024-01-10 NOTE — ANESTHESIA PROCEDURE NOTES
Airway  Date/Time: 1/10/2024 8:52 AM  Urgency: elective    Airway not difficult    Staffing  Performed: CRNA   Authorized by: EMA Moya-CRNA    Performed by: EMA Moya-TOAN  Patient location during procedure: OR    Indications and Patient Condition  Indications for airway management: anesthesia  Spontaneous Ventilation: absent  Sedation level: deep  Preoxygenated: yes  Mask difficulty assessment: 0 - not attempted    Final Airway Details  Final airway type: supraglottic airway      Successful airway: Supraglottic airway: iGel.  Size 3     Number of attempts at approach: 1

## 2024-01-10 NOTE — ANESTHESIA POSTPROCEDURE EVALUATION
Patient: Joan M. Shoenberger    Procedure Summary       Date: 01/10/24 Room / Location: [facility] / [facility]    Anesthesia Start: 0844 Anesthesia Stop: 0948    Procedures:       CYSTOSCOPY WITH LASERV LITHOTRIPSY, (Right)      CYSTOSCOPY WITH STENT (Right) Diagnosis:       Calculus of ureter      (RT URETHERAL STONE N20.1)    Surgeons: Leland Warren MD Responsible Provider: NESS Moya    Anesthesia Type: general ASA Status: 2            Anesthesia Type: general    Vitals Value Taken Time   /95 01/10/24 1015   Temp 36.3 °C (97.3 °F) 01/10/24 0944   Pulse 67 01/10/24 1015   Resp 13 01/10/24 1015   SpO2 100 % 01/10/24 1015       Anesthesia Post Evaluation    Patient location during evaluation: PACU  Patient participation: complete - patient participated  Level of consciousness: awake  Pain management: adequate  Multimodal analgesia pain management approach  Airway patency: patent  Two or more strategies used to mitigate risk of obstructive sleep apnea  Cardiovascular status: acceptable  Respiratory status: acceptable  Hydration status: acceptable  Postoperative Nausea and Vomiting: none        No notable events documented.

## 2024-01-10 NOTE — ANESTHESIA PREPROCEDURE EVALUATION
Patient: Joan M. Shoenberger    Procedure Information       Date/Time: 01/10/24 0830    Procedures:       CYSTOSCOPY WITH LASERV LITHOTRIPSY (Right) - RT URS,LL, STONE BASKETING, STENT      CYSTOSCOPY WITH STENT (Right)    Location: [facility] / [facility]    Surgeons: Leland Warren MD          Vitals:    01/10/24 0649   BP: 126/61   Pulse: 87   Resp: 16   Temp: 36.3 °C (97.3 °F)   SpO2: 99%       Past Surgical History:   Procedure Laterality Date    OTHER SURGICAL HISTORY  10/15/2020    Hysterectomy    OTHER SURGICAL HISTORY  10/15/2020    Gallbladder surgery    OTHER SURGICAL HISTORY  10/15/2020    Appendectomy    OTHER SURGICAL HISTORY  10/15/2020    Cystocele repair    OTHER SURGICAL HISTORY  10/15/2020    Tonsillectomy with adenoidectomy     Past Medical History:   Diagnosis Date    Calculus of left kidney 08/16/2021    Large Stag Horn, on 12/09/2023 measures 1.9 x 1.4 cm    Class 1 obesity     Depression with anxiety     BRAND (dyspnea on exertion)     Essential (primary) hypertension 05/26/2020    Benign essential HTN    Hx of low back pain     Personal history of other diseases of urinary system 10/06/2021    History of hematuria    Personal history of other diseases of urinary system 10/15/2020    History of hematuria    Personal history of other endocrine, nutritional and metabolic disease     History of hyperthyroidism       Current Facility-Administered Medications:     lactated Ringer's infusion, 100 mL/hr, intravenous, Continuous, Bebo Herrera MD, Last Rate: 100 mL/hr at 01/10/24 0741, 100 mL/hr at 01/10/24 0741  Prior to Admission medications    Medication Sig Start Date End Date Taking? Authorizing Provider   acetaminophen (Tylenol) 500 mg tablet Take 1 tablet (500 mg) by mouth every 6 hours if needed for mild pain (1 - 3). 2 AS NEEDED   Yes Historical Provider, MD   APPLE CIDER VINEGAR ORAL Take by mouth.   Yes Historical Provider, MD   aspirin 81 mg EC tablet Take 1 tablet (81 mg) by  mouth once daily.   Yes Historical Provider, MD   cyanocobalamin (Vitamin B-12) 1,000 mcg tablet Take 100 mcg by mouth once daily.   Yes Historical Provider, MD   levothyroxine (Synthroid, Levoxyl) 75 mcg tablet TAKE 1 TABLET BY MOUTH ONCE DAILY 8/17/23  Yes Dahlia Silva, DO   menthol (Biofreeze, menthol,) 4 % gel Apply topically.   Yes Historical Provider, MD   multivitamin tablet Take 1 tablet by mouth once daily.   Yes Historical Provider, MD     Allergies   Allergen Reactions    Famotidine Other and Swelling    Codeine Rash and Swelling    Penicillin Rash    Penicillins Rash     Social History     Tobacco Use    Smoking status: Never    Smokeless tobacco: Never   Substance Use Topics    Alcohol use: Not Currently         Chemistry    Lab Results   Component Value Date/Time     12/09/2023 1919    K 4.0 12/09/2023 1919     12/09/2023 1919    CO2 26 12/09/2023 1919    BUN 24 (H) 12/09/2023 1919    CREATININE 0.97 12/09/2023 1919    Lab Results   Component Value Date/Time    CALCIUM 8.8 12/09/2023 1919    ALKPHOS 60 12/09/2023 1919    AST 18 12/09/2023 1919    ALT 12 12/09/2023 1919    BILITOT 0.2 12/09/2023 1919          Lab Results   Component Value Date/Time    WBC 7.2 12/09/2023 1919    HGB 13.1 12/09/2023 1919    HCT 39.8 12/09/2023 1919     12/09/2023 1919     Lab Results   Component Value Date/Time    PROTIME 12.2 12/09/2023 1919    INR 1.1 12/09/2023 1919     No results found for this or any previous visit (from the past 4464 hour(s)).  No results found for this or any previous visit from the past 1095 days.    Relevant Problems   No relevant active problems       Clinical information reviewed:   Tobacco  Allergies  Meds   Med Hx  Surg Hx   Fam Hx  Soc Hx        NPO Detail:  NPO/Void Status  Carbohydrate Drink Given Prior to Surgery? : N  Date of Last Liquid: 01/09/24  Date of Last Solid: 01/09/24         Physical Exam    Airway  Mallampati: II     Cardiovascular - normal  exam     Dental    Pulmonary    Abdominal            Anesthesia Plan    History of general anesthesia?: yes  History of complications of general anesthesia?: no    ASA 2     general     The patient is not a current smoker.  Patient was not previously instructed to abstain from smoking on day of procedure.  Patient did not smoke on day of procedure.  Education provided regarding risk of obstructive sleep apnea.  intravenous induction   Anesthetic plan and risks discussed with patient.    Plan discussed with CRNA.

## 2024-01-11 LAB — BACTERIA UR CULT: NO GROWTH

## 2024-01-15 LAB
APPEARANCE STONE: NORMAL
COMPN STONE: NORMAL
SPECIMEN WT: 18 MG

## 2024-01-26 ENCOUNTER — LAB REQUISITION (OUTPATIENT)
Dept: LAB | Facility: LAB | Age: 74
End: 2024-01-26
Payer: MEDICARE

## 2024-01-26 DIAGNOSIS — R30.0 DYSURIA: ICD-10-CM

## 2024-01-26 PROCEDURE — 87086 URINE CULTURE/COLONY COUNT: CPT

## 2024-01-28 LAB — BACTERIA UR CULT: NORMAL

## 2024-01-29 ENCOUNTER — TELEPHONE (OUTPATIENT)
Dept: PRIMARY CARE | Facility: CLINIC | Age: 74
End: 2024-01-29
Payer: MEDICARE

## 2024-01-30 ENCOUNTER — LAB (OUTPATIENT)
Dept: LAB | Facility: LAB | Age: 74
End: 2024-01-30
Payer: MEDICARE

## 2024-01-30 ENCOUNTER — HOSPITAL ENCOUNTER (OUTPATIENT)
Dept: RADIOLOGY | Facility: CLINIC | Age: 74
Discharge: HOME | End: 2024-01-30
Payer: MEDICARE

## 2024-01-30 DIAGNOSIS — N20.0 CALCULUS OF KIDNEY: ICD-10-CM

## 2024-01-30 DIAGNOSIS — N20.0 CALCULUS OF KIDNEY: Primary | ICD-10-CM

## 2024-01-30 LAB
ANION GAP SERPL CALC-SCNC: 17 MMOL/L (ref 10–20)
BUN SERPL-MCNC: 24 MG/DL (ref 6–23)
CALCIUM SERPL-MCNC: 9.3 MG/DL (ref 8.6–10.3)
CHLORIDE SERPL-SCNC: 105 MMOL/L (ref 98–107)
CO2 SERPL-SCNC: 25 MMOL/L (ref 21–32)
CREAT SERPL-MCNC: 0.96 MG/DL (ref 0.5–1.05)
EGFRCR SERPLBLD CKD-EPI 2021: 62 ML/MIN/1.73M*2
GLUCOSE SERPL-MCNC: 98 MG/DL (ref 74–99)
POTASSIUM SERPL-SCNC: 4.5 MMOL/L (ref 3.5–5.3)
SODIUM SERPL-SCNC: 142 MMOL/L (ref 136–145)
URATE SERPL-MCNC: 5 MG/DL (ref 2.3–6.7)

## 2024-01-30 PROCEDURE — 36415 COLL VENOUS BLD VENIPUNCTURE: CPT

## 2024-01-30 PROCEDURE — 80048 BASIC METABOLIC PNL TOTAL CA: CPT

## 2024-01-30 PROCEDURE — 74018 RADEX ABDOMEN 1 VIEW: CPT

## 2024-01-30 PROCEDURE — 84550 ASSAY OF BLOOD/URIC ACID: CPT

## 2024-01-30 PROCEDURE — 83970 ASSAY OF PARATHORMONE: CPT

## 2024-01-30 PROCEDURE — 74018 RADEX ABDOMEN 1 VIEW: CPT | Performed by: RADIOLOGY

## 2024-01-30 NOTE — OP NOTE
CYSTOSCOPY WITH LASERV LITHOTRIPSY, (R), CYSTOSCOPY WITH STENT (R) Operative Note     Date: 1/10/2024  OR Location: [facility] OR    Name: Joan M. Shoenberger, : 1950, Age: 73 y.o., MRN: [id number], Sex: female    Diagnosis  Pre-op Diagnosis     * Calculus of ureter [N20.1] Post-op Diagnosis     * Calculus of ureter [N20.1]     Procedures  CYSTOSCOPY WITH LASERV LITHOTRIPSY,  57596 - CA CYSTO W/URETEROSCOPY W/LITHOTRIPSY    CYSTOSCOPY WITH STENT  30536 - CA CYSTO W/INSERT URETERAL STENT      Surgeons      * Leland Warren - Primary    Resident/Fellow/Other Assistant:  Surgeon(s) and Role:    Procedure Summary  Anesthesia: Consult  ASA: II  Anesthesia Staff: CRNA: EMA Moya-CRNA  Estimated Blood Loss: .mL  Intra-op Medications:   Administrations occurring from 0830 to 0930 on 01/10/24:   Medication Name Total Dose   sodium chloride 0.9 % irrigation solution 6,000 mL   lactated Ringer's infusion 260 mL              Anesthesia Record               Intraprocedure I/O Totals          Intake    lactated Ringer's infusion 405.00 mL    Total Intake 405 mL          Specimen:   ID Type Source Tests Collected by Time   A :  Fluid URINE CATHETERIZED URINE CULTURE Leland Warren MD 1/10/2024 0902   B : RIGHT RENAL STONE Calculus Ureter, Right CALCULI (STONE) ANALYSIS Leland Warren MD 1/10/2024 1511        Staff:   Circulator: Mel Armstrong RN  Relief Scrub: Anamaria Tidwell RN  Scrub Person: Shawn Mackay           Drains and/or Catheters: .    Findings: .    Indications: Joan M. Shoenberger is an 73 y.o. female who is having surgery for RT URETHERAL STONE N20.1. .    Procedure Details: .    Complications:  None; patient tolerated the procedure well.          Attending Attestation: I was present and scrubbed for the entire procedure.    Leland Warren  Phone Number: [phone]

## 2024-01-31 LAB — PTH-INTACT SERPL-MCNC: 68 PG/ML (ref 18.5–88)

## 2024-01-31 NOTE — TELEPHONE ENCOUNTER
Fatimah had left me a Voicemail stating she was returning a call.  I had tried call her home number X 2 call was dropped both times.

## 2024-02-08 NOTE — OP NOTE
CYSTOSCOPY WITH LASERV LITHOTRIPSY, (R), CYSTOSCOPY WITH STENT (R) Operative Note     Date: 1/10/2024  OR Location: [facility] OR    Name: Joan M. Shoenberger, : 1950, Age: 73 y.o., MRN: [id number], Sex: female    Diagnosis  Pre-op Diagnosis     * Calculus of ureter [N20.1] Post-op Diagnosis     * Calculus of ureter [N20.1]     Procedures  CYSTOSCOPY WITH LASERV LITHOTRIPSY,  58473 - CO CYSTO W/URETEROSCOPY W/LITHOTRIPSY    CYSTOSCOPY WITH STENT  22902 - CO CYSTO W/INSERT URETERAL STENT      Surgeons      * Leland Warren - Primary    Resident/Fellow/Other Assistant:  Surgeon(s) and Role:    Procedure Summary  Anesthesia: Consult  ASA: II  Anesthesia Staff: CRNA: EMA Moya-CRNA  Estimated Blood Loss: 0 mL  Intra-op Medications:   Administrations occurring from 0830 to 0930 on 01/10/24:   Medication Name Total Dose   sodium chloride 0.9 % irrigation solution 6,000 mL   lactated Ringer's infusion 260 mL              Anesthesia Record               Intraprocedure I/O Totals          Intake    lactated Ringer's infusion 405.00 mL    Total Intake 405 mL          Specimen:   ID Type Source Tests Collected by Time   A :  Fluid URINE CATHETERIZED URINE CULTURE Leland Warren MD 1/10/2024 0902   B : RIGHT RENAL STONE Calculus Ureter, Right CALCULI (STONE) ANALYSIS Leland Warren MD 1/10/2024 1511        Staff:   Circulator: Mel Armstrong RN  Relief Scrub: Anamaria Tidwell RN  Scrub Person: Shawn Mackay           Drains and/or Catheters: 6 F ureteral catheter    Findings: Stone fragmented    Indications: Joan M. Shoenberger is an 73 y.o. female who is having surgery for RT URETHERAL STONE N20.1.  She has a history of right ureteral stone and is scheduled for ureteroscopy and laser lithotripsy today.  Risks including infection, bleeding, injury to the ureter, failure to treat the stone, and for further procedures were reviewed.  She understands he may require a stent.    Procedure Details:  Patient was taken to the operating room and given general anesthesia.  She was sterilely prepped and draped in the modified lithotomy position.  A cystoscope was advanced through the urethra into the bladder.  A wire was advanced up the right ureter followed by a ureteroscope.  Stone was identified.  It measured 7 mm.  Laser lithotripsy was performed.  Stone basketing was performed.  The ureteroscope was withdrawn.  A 6 [place] double-J ureteral stent was placed without an attached suture.  Patient was transferred to the recovery room in stable condition.  She will follow-up in the office in a few weeks for removal of the stent.    Complications:  None; patient tolerated the procedure well.        Leland Warren  Phone Number: [phone]

## 2024-06-27 ENCOUNTER — HOSPITAL ENCOUNTER (OUTPATIENT)
Dept: RADIOLOGY | Facility: HOSPITAL | Age: 74
Discharge: HOME | End: 2024-06-27
Payer: MEDICARE

## 2024-06-27 DIAGNOSIS — N28.1 CYST OF KIDNEY, ACQUIRED: ICD-10-CM

## 2024-06-27 PROCEDURE — 76770 US EXAM ABDO BACK WALL COMP: CPT

## 2024-06-27 PROCEDURE — 76770 US EXAM ABDO BACK WALL COMP: CPT | Performed by: STUDENT IN AN ORGANIZED HEALTH CARE EDUCATION/TRAINING PROGRAM

## 2024-07-08 ENCOUNTER — TELEPHONE (OUTPATIENT)
Dept: PRIMARY CARE | Facility: CLINIC | Age: 74
End: 2024-07-08
Payer: MEDICARE

## 2024-07-08 NOTE — TELEPHONE ENCOUNTER
Requesting referral to nephrologist. The one she saw no longer goes to [place] and she would like to see Dr. Veliz in [place].

## 2024-07-17 DIAGNOSIS — N20.0 RENAL CALCULI: Primary | ICD-10-CM

## 2024-07-17 DIAGNOSIS — N28.9 RENAL INSUFFICIENCY: ICD-10-CM

## 2024-07-23 ENCOUNTER — APPOINTMENT (OUTPATIENT)
Dept: PRIMARY CARE | Facility: CLINIC | Age: 74
End: 2024-07-23
Payer: MEDICARE

## 2024-07-23 VITALS
OXYGEN SATURATION: 98 % | SYSTOLIC BLOOD PRESSURE: 114 MMHG | DIASTOLIC BLOOD PRESSURE: 62 MMHG | HEART RATE: 113 BPM | TEMPERATURE: 96.5 F | WEIGHT: 181 LBS | BODY MASS INDEX: 29.21 KG/M2

## 2024-07-23 DIAGNOSIS — G47.01 INSOMNIA DUE TO MEDICAL CONDITION: Primary | ICD-10-CM

## 2024-07-23 DIAGNOSIS — Z86.39 PERSONAL HISTORY OF OTHER ENDOCRINE, NUTRITIONAL AND METABOLIC DISEASE: ICD-10-CM

## 2024-07-23 PROCEDURE — 96127 BRIEF EMOTIONAL/BEHAV ASSMT: CPT | Performed by: FAMILY MEDICINE

## 2024-07-23 PROCEDURE — 1036F TOBACCO NON-USER: CPT | Performed by: FAMILY MEDICINE

## 2024-07-23 PROCEDURE — 1159F MED LIST DOCD IN RCRD: CPT | Performed by: FAMILY MEDICINE

## 2024-07-23 PROCEDURE — 99214 OFFICE O/P EST MOD 30 MIN: CPT | Performed by: FAMILY MEDICINE

## 2024-07-23 RX ORDER — LEVOTHYROXINE SODIUM 75 UG/1
75 TABLET ORAL DAILY
Qty: 90 TABLET | Refills: 3 | Status: SHIPPED | OUTPATIENT
Start: 2024-07-23

## 2024-07-23 RX ORDER — IBUPROFEN 100 MG/1
TABLET, CHEWABLE ORAL EVERY 8 HOURS PRN
COMMUNITY

## 2024-07-23 RX ORDER — MIRTAZAPINE 7.5 MG/1
7.5 TABLET, FILM COATED ORAL NIGHTLY
Qty: 30 TABLET | Refills: 2 | Status: SHIPPED | OUTPATIENT
Start: 2024-07-23 | End: 2024-10-21

## 2024-07-23 ASSESSMENT — ENCOUNTER SYMPTOMS
HEMATURIA: 0
APPETITE CHANGE: 0
SINUS PRESSURE: 0
LIGHT-HEADEDNESS: 0
EYE ITCHING: 0
SLEEP DISTURBANCE: 1
JOINT SWELLING: 0
DIARRHEA: 0
FEVER: 0
COUGH: 0
WHEEZING: 0
HEADACHES: 0
DYSPHORIC MOOD: 0
FLANK PAIN: 1
WEAKNESS: 0
VOMITING: 0
CONSTIPATION: 0
DYSURIA: 0
ACTIVITY CHANGE: 0
ARTHRALGIAS: 0
SHORTNESS OF BREATH: 0
SORE THROAT: 0
DIZZINESS: 0
MYALGIAS: 0
RHINORRHEA: 0
NUMBNESS: 0
UNEXPECTED WEIGHT CHANGE: 0
BLOOD IN STOOL: 0
EYE DISCHARGE: 0
ABDOMINAL PAIN: 0
NERVOUS/ANXIOUS: 0
NAUSEA: 0
PALPITATIONS: 0

## 2024-07-23 NOTE — PATIENT INSTRUCTIONS
SEE DR HEREDIA THE NEPHROLOGIST   REFILLED THE SYNTHROID   ALSO STARTED THE REMERON AT A LOW DOSE  AVOID THE IBUPROFEN PRODUCTS  PLAIN TYLENOL ONLY FOR PAIN     SEE IN ONE MONTH ABOUT THE ANXIETY AND SLEEP

## 2024-07-23 NOTE — PROGRESS NOTES
Subjective   Patient ID: Joan M. Shoenberger is a 73 y.o. female who presents for Insomnia (DISCUSS REMERON AND BUSPAR), DISCUSS KIDNEY CONCERNS (GOING TO SEE A KIDNEY SPECIALISTS-9/18), and Anxiety.    HPI 73 and normotensive /with recent declining GFR FOUND BY DR GARCIA,UROLOGY AND REFERRED TO NEPHROLOGY HAS STONES  (YAN)      Review of Systems   Constitutional:  Negative for activity change, appetite change, fever and unexpected weight change.   HENT:  Negative for congestion, ear pain, postnasal drip, rhinorrhea, sinus pressure and sore throat.    Eyes:  Negative for discharge, itching and visual disturbance.   Respiratory:  Negative for cough, shortness of breath and wheezing.    Cardiovascular:  Negative for chest pain, palpitations and leg swelling.   Gastrointestinal:  Negative for abdominal pain, blood in stool, constipation, diarrhea, nausea and vomiting.   Endocrine: Negative for cold intolerance, heat intolerance and polyuria.   Genitourinary:  Positive for flank pain. Negative for dysuria and hematuria.        LEFT STAGHORN CALCULI   OTHERS REMOVED FROM RIGHT KIDNEY    Musculoskeletal:  Negative for arthralgias, gait problem, joint swelling and myalgias.   Skin:  Negative for rash.   Allergic/Immunologic: Negative for environmental allergies and food allergies.   Neurological:  Negative for dizziness, syncope, weakness, light-headedness, numbness and headaches.   Psychiatric/Behavioral:  Positive for sleep disturbance. Negative for dysphoric mood. The patient is not nervous/anxious.        Objective   /62   Pulse (!) 113   Temp 35.8 °C (96.5 °F)   Wt 82.1 kg (181 lb)   SpO2 98%   BMI 29.21 kg/m²     Physical Exam  Vitals and nursing note reviewed.   Constitutional:       Appearance: Normal appearance.   HENT:      Head: Normocephalic.      Mouth/Throat:      Mouth: Mucous membranes are moist.   Cardiovascular:      Rate and Rhythm: Normal rate and regular rhythm.      Pulses: Normal  "pulses.      Heart sounds: Normal heart sounds. No murmur heard.     No friction rub. No gallop.   Pulmonary:      Effort: Pulmonary effort is normal. No respiratory distress.      Breath sounds: Normal breath sounds. No wheezing.   Abdominal:      General: Bowel sounds are normal. There is no distension.      Palpations: Abdomen is soft.      Tenderness: There is no abdominal tenderness. There is no left CVA tenderness.   Genitourinary:     Comments: CHRONIC PAIN FROM STAGHORN CALCULI ON THE LEFT   Musculoskeletal:         General: No deformity. Normal range of motion.   Skin:     General: Skin is warm and dry.      Capillary Refill: Capillary refill takes less than 2 seconds.   Neurological:      General: No focal deficit present.      Mental Status: She is alert and oriented to person, place, and time.   Psychiatric:      Comments: ROLF 7 IS >20 AMD PHQ >20  PATIENT STATES THAT \"I'M SO IRRITABLE AND CAN'T SLEEP \"         Assessment/Plan   Diagnoses and all orders for this visit:  Insomnia due to medical condition  -     mirtazapine (Remeron) 7.5 mg tablet; Take 1 tablet (7.5 mg) by mouth once daily at bedtime.  Personal history of other endocrine, nutritional and metabolic disease  -     levothyroxine (Synthroid, Levoxyl) 75 mcg tablet; Take 1 tablet (75 mcg) by mouth once daily.         "

## 2024-08-21 ENCOUNTER — APPOINTMENT (OUTPATIENT)
Dept: PRIMARY CARE | Facility: CLINIC | Age: 74
End: 2024-08-21
Payer: MEDICARE

## 2024-08-21 VITALS
TEMPERATURE: 97.9 F | SYSTOLIC BLOOD PRESSURE: 124 MMHG | OXYGEN SATURATION: 99 % | DIASTOLIC BLOOD PRESSURE: 70 MMHG | BODY MASS INDEX: 29.05 KG/M2 | HEART RATE: 106 BPM | WEIGHT: 180 LBS

## 2024-08-21 DIAGNOSIS — F41.9 ANXIETY: Primary | ICD-10-CM

## 2024-08-21 DIAGNOSIS — E03.9 HYPOTHYROIDISM, UNSPECIFIED TYPE: ICD-10-CM

## 2024-08-21 DIAGNOSIS — N18.31 STAGE 3A CHRONIC KIDNEY DISEASE (MULTI): ICD-10-CM

## 2024-08-21 DIAGNOSIS — Z86.39 PERSONAL HISTORY OF OTHER ENDOCRINE, NUTRITIONAL AND METABOLIC DISEASE: ICD-10-CM

## 2024-08-21 LAB
ALBUMIN SERPL BCP-MCNC: 3.8 G/DL (ref 3.4–5)
ALP SERPL-CCNC: 80 U/L (ref 33–136)
ALT SERPL W P-5'-P-CCNC: 12 U/L (ref 7–45)
ANION GAP SERPL CALC-SCNC: 15 MMOL/L (ref 10–20)
AST SERPL W P-5'-P-CCNC: 19 U/L (ref 9–39)
BASOPHILS # BLD AUTO: 0.04 X10*3/UL (ref 0–0.1)
BASOPHILS NFR BLD AUTO: 0.6 %
BILIRUB SERPL-MCNC: 0.7 MG/DL (ref 0–1.2)
BUN SERPL-MCNC: 16 MG/DL (ref 6–23)
CALCIUM SERPL-MCNC: 8.6 MG/DL (ref 8.6–10.3)
CHLORIDE SERPL-SCNC: 104 MMOL/L (ref 98–107)
CO2 SERPL-SCNC: 25 MMOL/L (ref 21–32)
CREAT SERPL-MCNC: 0.91 MG/DL (ref 0.5–1.05)
EGFRCR SERPLBLD CKD-EPI 2021: 66 ML/MIN/1.73M*2
EOSINOPHIL # BLD AUTO: 0.14 X10*3/UL (ref 0–0.4)
EOSINOPHIL NFR BLD AUTO: 2.2 %
ERYTHROCYTE [DISTWIDTH] IN BLOOD BY AUTOMATED COUNT: 13.1 % (ref 11.5–14.5)
GLUCOSE SERPL-MCNC: 113 MG/DL (ref 74–99)
HCT VFR BLD AUTO: 43.8 % (ref 36–46)
HGB BLD-MCNC: 13.9 G/DL (ref 12–16)
IMM GRANULOCYTES # BLD AUTO: 0.02 X10*3/UL (ref 0–0.5)
IMM GRANULOCYTES NFR BLD AUTO: 0.3 % (ref 0–0.9)
LYMPHOCYTES # BLD AUTO: 1.98 X10*3/UL (ref 0.8–3)
LYMPHOCYTES NFR BLD AUTO: 31 %
MCH RBC QN AUTO: 30.8 PG (ref 26–34)
MCHC RBC AUTO-ENTMCNC: 31.7 G/DL (ref 32–36)
MCV RBC AUTO: 97 FL (ref 80–100)
MONOCYTES # BLD AUTO: 0.45 X10*3/UL (ref 0.05–0.8)
MONOCYTES NFR BLD AUTO: 7.1 %
NEUTROPHILS # BLD AUTO: 3.75 X10*3/UL (ref 1.6–5.5)
NEUTROPHILS NFR BLD AUTO: 58.8 %
NRBC BLD-RTO: 0 /100 WBCS (ref 0–0)
PLATELET # BLD AUTO: 221 X10*3/UL (ref 150–450)
POTASSIUM SERPL-SCNC: 4.3 MMOL/L (ref 3.5–5.3)
PROT SERPL-MCNC: 6.4 G/DL (ref 6.4–8.2)
RBC # BLD AUTO: 4.51 X10*6/UL (ref 4–5.2)
SODIUM SERPL-SCNC: 140 MMOL/L (ref 136–145)
WBC # BLD AUTO: 6.4 X10*3/UL (ref 4.4–11.3)

## 2024-08-21 PROCEDURE — 85025 COMPLETE CBC W/AUTO DIFF WBC: CPT

## 2024-08-21 PROCEDURE — 96127 BRIEF EMOTIONAL/BEHAV ASSMT: CPT | Performed by: FAMILY MEDICINE

## 2024-08-21 PROCEDURE — 99214 OFFICE O/P EST MOD 30 MIN: CPT | Performed by: FAMILY MEDICINE

## 2024-08-21 PROCEDURE — 80053 COMPREHEN METABOLIC PANEL: CPT

## 2024-08-21 RX ORDER — LEVOTHYROXINE SODIUM 75 UG/1
75 TABLET ORAL DAILY
Qty: 90 TABLET | Refills: 3 | Status: SHIPPED | OUTPATIENT
Start: 2024-08-21

## 2024-08-21 RX ORDER — PAROXETINE 10 MG/1
10 TABLET, FILM COATED ORAL EVERY MORNING
Qty: 30 TABLET | Refills: 1 | Status: SHIPPED | OUTPATIENT
Start: 2024-08-21 | End: 2024-10-20

## 2024-08-21 RX ORDER — LEVOTHYROXINE SODIUM 75 UG/1
75 TABLET ORAL DAILY
Qty: 90 TABLET | Refills: 3 | Status: SHIPPED | OUTPATIENT
Start: 2024-08-21 | End: 2024-08-21

## 2024-08-21 NOTE — PROGRESS NOTES
Subjective   Patient ID: Joan M. Shoenberger is a 73 y.o. female who presents for Anxiety (SCALES COMPLETED.  REMERON DISCONTINUED BY PT-) and MED FOLLOW UP.    HPI PATIENT WAS GIVEN REMERON A MONTH AGO FOR POOR SLEEP   ROOM SPINNING  AND DRUGGED     Review of Systems   Constitutional:  Negative for activity change, appetite change, fever and unexpected weight change.   HENT:  Negative for congestion, ear pain, postnasal drip, rhinorrhea, sinus pressure and sore throat.    Eyes:  Negative for discharge, itching and visual disturbance.   Respiratory:  Negative for cough, shortness of breath and wheezing.    Cardiovascular:  Negative for chest pain, palpitations and leg swelling.   Gastrointestinal:  Negative for abdominal pain, blood in stool, constipation, diarrhea, nausea and vomiting.   Endocrine: Negative for cold intolerance, heat intolerance and polyuria.   Genitourinary:  Negative for dysuria, flank pain and hematuria.   Musculoskeletal:  Negative for arthralgias, gait problem, joint swelling and myalgias.   Skin:  Negative for rash.   Allergic/Immunologic: Negative for environmental allergies and food allergies.   Neurological:  Negative for dizziness, syncope, weakness, light-headedness, numbness and headaches.   Psychiatric/Behavioral:  Positive for dysphoric mood and sleep disturbance. The patient is nervous/anxious.        Objective   /70   Pulse 106   Temp 36.6 °C (97.9 °F)   Wt 81.6 kg (180 lb)   SpO2 99%   BMI 29.05 kg/m²     Physical Exam  Vitals and nursing note reviewed.   Constitutional:       Appearance: Normal appearance.   HENT:      Head: Normocephalic.   Cardiovascular:      Rate and Rhythm: Normal rate and regular rhythm.      Pulses: Normal pulses.      Heart sounds: Normal heart sounds. No murmur heard.     No friction rub. No gallop.      Comments: EKG IN 2019 SHOWS NO Q-T ABNORMALITIES   Pulmonary:      Effort: Pulmonary effort is normal. No respiratory distress.      Breath  sounds: Normal breath sounds. No wheezing.   Abdominal:      General: There is no distension.      Palpations: Abdomen is soft.      Tenderness: There is no abdominal tenderness.   Musculoskeletal:         General: No deformity. Normal range of motion.   Skin:     General: Skin is warm and dry.      Capillary Refill: Capillary refill takes less than 2 seconds.   Neurological:      General: No focal deficit present.      Mental Status: She is alert and oriented to person, place, and time.   Psychiatric:      Comments: PHQ 14  ROLF IS 18  COULD NOT TAKE REMERON   SIMILAR TO LAST MONTH ON THE SCALES   SHE TOOK PAXIL BEFORE AND WOULD LIKE TO TAKE THAT AGAIN          Assessment/Plan   Problem List Items Addressed This Visit    None  Visit Diagnoses         Codes    Anxiety    -  Primary F41.9    Relevant Medications    PARoxetine (Paxil) 10 mg tablet    Personal history of other endocrine, nutritional and metabolic disease     Z86.39    Hypothyroidism, unspecified type     E03.9    Relevant Medications    levothyroxine (Synthroid, Levoxyl) 75 mcg tablet    Stage 3a chronic kidney disease (Multi)     N18.31    Relevant Orders    CBC and Auto Differential (Completed)    Comprehensive Metabolic Panel (Completed)

## 2024-08-23 ASSESSMENT — ENCOUNTER SYMPTOMS
SHORTNESS OF BREATH: 0
WEAKNESS: 0
DYSURIA: 0
LIGHT-HEADEDNESS: 0
SINUS PRESSURE: 0
SORE THROAT: 0
HEMATURIA: 0
EYE DISCHARGE: 0
ACTIVITY CHANGE: 0
FEVER: 0
RHINORRHEA: 0
NAUSEA: 0
BLOOD IN STOOL: 0
EYE ITCHING: 0
APPETITE CHANGE: 0
VOMITING: 0
JOINT SWELLING: 0
UNEXPECTED WEIGHT CHANGE: 0
CONSTIPATION: 0
COUGH: 0
DIARRHEA: 0
ABDOMINAL PAIN: 0
WHEEZING: 0
FLANK PAIN: 0
SLEEP DISTURBANCE: 1
PALPITATIONS: 0
NUMBNESS: 0
DYSPHORIC MOOD: 1
MYALGIAS: 0
DIZZINESS: 0
HEADACHES: 0
ARTHRALGIAS: 0
NERVOUS/ANXIOUS: 1

## 2024-09-30 ENCOUNTER — APPOINTMENT (OUTPATIENT)
Dept: PRIMARY CARE | Facility: CLINIC | Age: 74
End: 2024-09-30
Payer: MEDICARE

## 2024-09-30 VITALS
OXYGEN SATURATION: 97 % | BODY MASS INDEX: 28.73 KG/M2 | TEMPERATURE: 97.2 F | SYSTOLIC BLOOD PRESSURE: 132 MMHG | DIASTOLIC BLOOD PRESSURE: 72 MMHG | HEART RATE: 102 BPM | WEIGHT: 178 LBS

## 2024-09-30 DIAGNOSIS — F41.9 ANXIETY: Primary | ICD-10-CM

## 2024-09-30 PROCEDURE — 99214 OFFICE O/P EST MOD 30 MIN: CPT | Performed by: FAMILY MEDICINE

## 2024-09-30 PROCEDURE — 1036F TOBACCO NON-USER: CPT | Performed by: FAMILY MEDICINE

## 2024-09-30 PROCEDURE — G0008 ADMIN INFLUENZA VIRUS VAC: HCPCS | Performed by: FAMILY MEDICINE

## 2024-09-30 PROCEDURE — 96127 BRIEF EMOTIONAL/BEHAV ASSMT: CPT | Performed by: FAMILY MEDICINE

## 2024-09-30 PROCEDURE — 90662 IIV NO PRSV INCREASED AG IM: CPT | Performed by: FAMILY MEDICINE

## 2024-09-30 PROCEDURE — 1159F MED LIST DOCD IN RCRD: CPT | Performed by: FAMILY MEDICINE

## 2024-09-30 RX ORDER — PAROXETINE 10 MG/1
10 TABLET, FILM COATED ORAL EVERY MORNING
Qty: 90 TABLET | Refills: 3 | Status: SHIPPED | OUTPATIENT
Start: 2024-09-30 | End: 2025-09-30

## 2024-09-30 ASSESSMENT — ENCOUNTER SYMPTOMS
SORE THROAT: 0
JOINT SWELLING: 0
DYSPHORIC MOOD: 1
HEMATURIA: 0
VOMITING: 0
RHINORRHEA: 0
SLEEP DISTURBANCE: 0
SHORTNESS OF BREATH: 0
NUMBNESS: 0
WHEEZING: 0
EYE ITCHING: 0
DIZZINESS: 0
MYALGIAS: 0
NERVOUS/ANXIOUS: 1
NAUSEA: 0
UNEXPECTED WEIGHT CHANGE: 0
ARTHRALGIAS: 0
EYE DISCHARGE: 0
LIGHT-HEADEDNESS: 0
ACTIVITY CHANGE: 0
APPETITE CHANGE: 0
SINUS PRESSURE: 0
BLOOD IN STOOL: 0
ABDOMINAL PAIN: 0
HEADACHES: 0
DYSURIA: 0
CONSTIPATION: 0
FLANK PAIN: 1
FEVER: 0
COUGH: 0
DIARRHEA: 0
PALPITATIONS: 0
WEAKNESS: 0

## 2024-09-30 NOTE — PATIENT INSTRUCTIONS
YOU ARE DOING WELL WITH THE ANXIETY  WE WILL CONTINUE WITH THE SAME MEDICATION AND DOSE   SEE IN 6 MONTHS   ALWAYS SOONER WHEN YOU NEED US  FLU VACCINE TODAY

## 2024-09-30 NOTE — PROGRESS NOTES
Subjective   Patient ID: Joan M. Shoenberger is a 74 y.o. female who presents for Depression (Feels like her med is working well.  No current complaints.).    HPI PATIENT IS ANXIOUS FOR SOME TIME  DOING WELL ON THE LOW DOSE OF PAXIL     Review of Systems   Constitutional:  Negative for activity change, appetite change, fever and unexpected weight change.   HENT:  Negative for congestion, ear pain, postnasal drip, rhinorrhea, sinus pressure and sore throat.    Eyes:  Negative for discharge, itching and visual disturbance.   Respiratory:  Negative for cough, shortness of breath and wheezing.    Cardiovascular:  Negative for chest pain, palpitations and leg swelling.   Gastrointestinal:  Negative for abdominal pain, blood in stool, constipation, diarrhea, nausea and vomiting.   Endocrine: Negative for cold intolerance, heat intolerance and polyuria.   Genitourinary:  Positive for flank pain. Negative for dysuria and hematuria.        LARGE STAGHORN CALCULUS   CKD  SEEING NEW NEPHROLOGIST    Musculoskeletal:  Negative for arthralgias, gait problem, joint swelling and myalgias.   Skin:  Negative for rash.   Allergic/Immunologic: Negative for environmental allergies and food allergies.   Neurological:  Negative for dizziness, syncope, weakness, light-headedness, numbness and headaches.   Psychiatric/Behavioral:  Positive for dysphoric mood. Negative for sleep disturbance. The patient is nervous/anxious.        Objective   /72   Pulse 102   Temp 36.2 °C (97.2 °F)   Wt 80.7 kg (178 lb)   SpO2 97%   BMI 28.73 kg/m²     Physical Exam  Vitals and nursing note reviewed.   Constitutional:       Appearance: Normal appearance.   HENT:      Head: Normocephalic.   Cardiovascular:      Rate and Rhythm: Normal rate and regular rhythm.      Pulses: Normal pulses.      Heart sounds: No murmur heard.     No friction rub. No gallop.   Pulmonary:      Effort: Pulmonary effort is normal. No respiratory distress.      Breath  sounds: No wheezing.   Abdominal:      General: There is no distension.      Tenderness: There is no abdominal tenderness.   Musculoskeletal:         General: No deformity. Normal range of motion.   Skin:     Capillary Refill: Capillary refill takes less than 2 seconds.   Neurological:      General: No focal deficit present.      Mental Status: She is alert and oriented to person, place, and time.   Psychiatric:         Mood and Affect: Mood normal.      Comments: ROLF IS 2   PHQ IS 4   DOING WELL         Assessment/Plan   Problem List Items Addressed This Visit    None  Visit Diagnoses         Codes    Anxiety    -  Primary F41.9    Relevant Medications    PARoxetine (Paxil) 10 mg tablet

## 2025-03-24 ENCOUNTER — APPOINTMENT (OUTPATIENT)
Dept: PRIMARY CARE | Facility: CLINIC | Age: 75
End: 2025-03-24
Payer: MEDICARE

## 2025-03-24 VITALS
TEMPERATURE: 97.1 F | DIASTOLIC BLOOD PRESSURE: 98 MMHG | SYSTOLIC BLOOD PRESSURE: 172 MMHG | WEIGHT: 176.1 LBS | HEART RATE: 132 BPM | OXYGEN SATURATION: 98 % | BODY MASS INDEX: 28.42 KG/M2

## 2025-03-24 DIAGNOSIS — N20.0 STAGHORN RENAL CALCULUS: Primary | ICD-10-CM

## 2025-03-24 PROCEDURE — 1159F MED LIST DOCD IN RCRD: CPT | Performed by: FAMILY MEDICINE

## 2025-03-24 PROCEDURE — 1036F TOBACCO NON-USER: CPT | Performed by: FAMILY MEDICINE

## 2025-03-24 PROCEDURE — 99213 OFFICE O/P EST LOW 20 MIN: CPT | Performed by: FAMILY MEDICINE

## 2025-03-24 ASSESSMENT — ENCOUNTER SYMPTOMS
ABDOMINAL PAIN: 0
HEADACHES: 0
DIARRHEA: 0
NERVOUS/ANXIOUS: 0
APPETITE CHANGE: 0
MYALGIAS: 0
PALPITATIONS: 0
DYSPHORIC MOOD: 0
VOMITING: 0
LIGHT-HEADEDNESS: 0
FLANK PAIN: 0
DIZZINESS: 0
NAUSEA: 0
WEAKNESS: 0
SHORTNESS OF BREATH: 0
BLOOD IN STOOL: 0
RHINORRHEA: 0
DIFFICULTY URINATING: 1
WHEEZING: 0
UNEXPECTED WEIGHT CHANGE: 0
JOINT SWELLING: 0
SINUS PRESSURE: 0
COUGH: 0
SORE THROAT: 0
ACTIVITY CHANGE: 0
CONSTIPATION: 0
FEVER: 0
DYSURIA: 0
EYE ITCHING: 0
HEMATURIA: 0
EYE DISCHARGE: 0
ARTHRALGIAS: 0
NUMBNESS: 0
SLEEP DISTURBANCE: 0

## 2025-03-24 ASSESSMENT — PATIENT HEALTH QUESTIONNAIRE - PHQ9
2. FEELING DOWN, DEPRESSED OR HOPELESS: NOT AT ALL
1. LITTLE INTEREST OR PLEASURE IN DOING THINGS: NOT AT ALL
SUM OF ALL RESPONSES TO PHQ9 QUESTIONS 1 AND 2: 0

## 2025-03-24 NOTE — PROGRESS NOTES
Subjective   Patient ID: Joan M. Shoenberger is a 74 y.o. female who presents for Follow-up (UNCONTROLLED URINATION) and Anxiety (6MTH FUV).    HPI FOLLOWS WITH THE RENAL SPECIALIST IN 7-25     Review of Systems   Constitutional:  Negative for activity change, appetite change, fever and unexpected weight change.   HENT:  Negative for congestion, ear pain, postnasal drip, rhinorrhea, sinus pressure and sore throat.    Eyes:  Negative for discharge, itching and visual disturbance.   Respiratory:  Negative for cough, shortness of breath and wheezing.    Cardiovascular:  Negative for chest pain, palpitations and leg swelling.   Gastrointestinal:  Negative for abdominal pain, blood in stool, constipation, diarrhea, nausea and vomiting.   Endocrine: Negative for cold intolerance, heat intolerance and polyuria.   Genitourinary:  Positive for difficulty urinating. Negative for dysuria, flank pain and hematuria.        INCONTINENCE SINCE PROCEDURE   URINE APPEARS NORMAL   Musculoskeletal:  Negative for arthralgias, gait problem, joint swelling and myalgias.   Skin:  Negative for rash.   Allergic/Immunologic: Negative for environmental allergies and food allergies.   Neurological:  Negative for dizziness, syncope, weakness, light-headedness, numbness and headaches.   Psychiatric/Behavioral:  Negative for dysphoric mood and sleep disturbance. The patient is not nervous/anxious.        Objective   BP (!) 172/98   Pulse (!) 132   Temp 36.2 °C (97.1 °F)   Wt 79.9 kg (176 lb 1.6 oz)   SpO2 98%   BMI 28.42 kg/m²     Physical Exam  Vitals and nursing note reviewed.   Constitutional:       Appearance: Normal appearance.   HENT:      Head: Normocephalic.   Cardiovascular:      Rate and Rhythm: Normal rate.      Pulses: Normal pulses.      Heart sounds: No murmur heard.     No friction rub. No gallop.   Pulmonary:      Effort: Pulmonary effort is normal. No respiratory distress.      Breath sounds: No wheezing.   Abdominal:       General: There is no distension.      Tenderness: There is no abdominal tenderness. There is no left CVA tenderness.   Musculoskeletal:         General: No deformity. Normal range of motion.   Skin:     General: Skin is warm and dry.      Capillary Refill: Capillary refill takes less than 2 seconds.   Neurological:      General: No focal deficit present.      Mental Status: She is alert and oriented to person, place, and time.   Psychiatric:         Mood and Affect: Mood normal.         Assessment/Plan   Problem List Items Addressed This Visit    None  Visit Diagnoses         Codes    Staghorn renal calculus    -  Primary N20.0

## 2025-03-24 NOTE — PATIENT INSTRUCTIONS
CONTINUE WITH THE 4 BOTTLES OF WATER  FRUIT   FOLLOW WITH NEPHROLOGIST IN [place]   SOONER HER IF INFECTION

## 2025-05-27 ENCOUNTER — APPOINTMENT (OUTPATIENT)
Dept: PRIMARY CARE | Facility: CLINIC | Age: 75
End: 2025-05-27
Payer: MEDICARE

## 2025-05-27 VITALS
OXYGEN SATURATION: 96 % | BODY MASS INDEX: 28.57 KG/M2 | TEMPERATURE: 97.8 F | SYSTOLIC BLOOD PRESSURE: 130 MMHG | HEART RATE: 100 BPM | DIASTOLIC BLOOD PRESSURE: 80 MMHG | WEIGHT: 177 LBS

## 2025-05-27 DIAGNOSIS — R42 VERTIGO: Primary | ICD-10-CM

## 2025-05-27 PROCEDURE — 1159F MED LIST DOCD IN RCRD: CPT | Performed by: FAMILY MEDICINE

## 2025-05-27 PROCEDURE — 99213 OFFICE O/P EST LOW 20 MIN: CPT | Performed by: FAMILY MEDICINE

## 2025-05-27 RX ORDER — MECLIZINE HYDROCHLORIDE 25 MG/1
25 TABLET ORAL 3 TIMES DAILY PRN
Qty: 30 TABLET | Refills: 11 | Status: SHIPPED | OUTPATIENT
Start: 2025-05-27 | End: 2026-05-27

## 2025-05-27 NOTE — PATIENT INSTRUCTIONS
VERTIGO SELF LIMITING   VIRAL OR ALLERGIC   CLARITIN  IS GOOD     SENT ANTIVERT   MORE WATER AND JUST REST

## 2025-05-27 NOTE — PROGRESS NOTES
Subjective   Patient ID: Joan M. Shoenberger is a 74 y.o. female who presents for Dizziness (Started Monday 5/19/25. Occurring daily at random times, but mostly when turning in bed. Orthostatics completed.).  HPISEE ABOVE     Review of Systems   Constitutional:  Negative for activity change, appetite change, fever and unexpected weight change.   HENT:  Negative for congestion, ear pain, postnasal drip, rhinorrhea, sinus pressure and sore throat.    Eyes:  Negative for discharge, itching and visual disturbance.   Respiratory:  Negative for cough, shortness of breath and wheezing.    Cardiovascular:  Negative for chest pain, palpitations and leg swelling.   Gastrointestinal:  Negative for abdominal pain, blood in stool, constipation, diarrhea, nausea and vomiting.   Endocrine: Negative for cold intolerance, heat intolerance and polyuria.   Genitourinary:  Negative for dysuria, flank pain and hematuria.   Musculoskeletal:  Negative for arthralgias, gait problem, joint swelling and myalgias.   Skin:  Negative for rash.   Allergic/Immunologic: Negative for environmental allergies and food allergies.   Neurological:  Positive for dizziness and light-headedness. Negative for syncope, weakness, numbness and headaches.   Psychiatric/Behavioral:  Negative for dysphoric mood and sleep disturbance. The patient is not nervous/anxious.            7/23/2024     1:05 PM 8/21/2024     1:07 PM 9/30/2024     1:05 PM 3/24/2025    12:58 PM 5/27/2025    11:23 AM 5/27/2025    11:24 AM 5/27/2025    11:25 AM   Vitals   Systolic 114 124 132 172 140 130 130   Diastolic 62 70 72 98 68 70 80   Heart Rate 113 106 102 132 86 86 100   Temp 35.8 °C (96.5 °F) 36.6 °C (97.9 °F) 36.2 °C (97.2 °F) 36.2 °C (97.1 °F) 36.6 °C (97.8 °F)     Weight (lb) 181 180 178 176.1 177     BMI 29.21 kg/m2 29.05 kg/m2 28.73 kg/m2 28.42 kg/m2 28.57 kg/m2     BSA (m2) 1.96 m2 1.95 m2 1.94 m2 1.93 m2 1.93 m2     Visit Report Report Report Report Report Report Report  Report       Body mass index is 28.57 kg/m².      Objective   Physical Exam  Vitals and nursing note reviewed.   Constitutional:       Appearance: Normal appearance.   HENT:      Head: Normocephalic.   Cardiovascular:      Rate and Rhythm: Normal rate and regular rhythm.      Pulses: Normal pulses.      Heart sounds: Normal heart sounds. No murmur heard.     No friction rub. No gallop.   Pulmonary:      Effort: Pulmonary effort is normal. No respiratory distress.      Breath sounds: Normal breath sounds. No wheezing.   Abdominal:      General: Bowel sounds are normal. There is no distension.      Palpations: Abdomen is soft.      Tenderness: There is no abdominal tenderness.   Musculoskeletal:         General: No deformity. Normal range of motion.   Skin:     General: Skin is warm and dry.      Capillary Refill: Capillary refill takes less than 2 seconds.   Neurological:      General: No focal deficit present.      Mental Status: She is alert and oriented to person, place, and time.      Comments: PATIENT MOVED HEAD WELL WITHOUT OBVIOUS SIGNS OF VERTIGO   NO NEAR SYNCOPE    Psychiatric:         Mood and Affect: Mood normal.         Assessment/Plan   Problem List Items Addressed This Visit       Vertigo - Primary    Relevant Medications    meclizine (Antivert) 25 mg tablet          Dahlia Silva DO

## 2025-06-02 PROBLEM — R42 VERTIGO: Status: ACTIVE | Noted: 2025-06-02

## 2025-06-02 ASSESSMENT — ENCOUNTER SYMPTOMS
HEMATURIA: 0
PALPITATIONS: 0
DIZZINESS: 1
SHORTNESS OF BREATH: 0
HEADACHES: 0
EYE ITCHING: 0
NAUSEA: 0
JOINT SWELLING: 0
BLOOD IN STOOL: 0
DIARRHEA: 0
DYSPHORIC MOOD: 0
NERVOUS/ANXIOUS: 0
VOMITING: 0
ABDOMINAL PAIN: 0
RHINORRHEA: 0
EYE DISCHARGE: 0
COUGH: 0
WHEEZING: 0
ARTHRALGIAS: 0
APPETITE CHANGE: 0
FEVER: 0
CONSTIPATION: 0
MYALGIAS: 0
LIGHT-HEADEDNESS: 1
SLEEP DISTURBANCE: 0
DYSURIA: 0
ACTIVITY CHANGE: 0
WEAKNESS: 0
FLANK PAIN: 0
NUMBNESS: 0
SINUS PRESSURE: 0
UNEXPECTED WEIGHT CHANGE: 0
SORE THROAT: 0

## 2025-07-14 DIAGNOSIS — M54.9 CHRONIC MIDLINE BACK PAIN, UNSPECIFIED BACK LOCATION: ICD-10-CM

## 2025-07-14 DIAGNOSIS — G89.29 CHRONIC MIDLINE BACK PAIN, UNSPECIFIED BACK LOCATION: ICD-10-CM

## 2025-09-02 DIAGNOSIS — E03.9 HYPOTHYROIDISM, UNSPECIFIED TYPE: ICD-10-CM

## 2025-09-02 DIAGNOSIS — F41.9 ANXIETY: ICD-10-CM

## 2025-09-03 RX ORDER — PAROXETINE 10 MG/1
10 TABLET, FILM COATED ORAL EVERY MORNING
Qty: 90 TABLET | Refills: 3 | Status: SHIPPED | OUTPATIENT
Start: 2025-09-03 | End: 2026-09-03

## 2025-09-03 RX ORDER — LEVOTHYROXINE SODIUM 75 UG/1
75 TABLET ORAL DAILY
Qty: 90 TABLET | Refills: 3 | Status: SHIPPED | OUTPATIENT
Start: 2025-09-03

## (undated) DEVICE — CATHETER, DUAL LUMEN, UDC/10/50 M

## (undated) DEVICE — CATHETER, URETERAL, POLLACK, OPEN END, 5.5 FR, 70 CM

## (undated) DEVICE — TUBING, SUCTION, CONNECTING, NON-CONDUCTIVE, SURE GRIP CONNECTORS, 3/16 IN X 10 FT

## (undated) DEVICE — SYRINGE, LUER LOCK, 12ML

## (undated) DEVICE — PREP TRAY, SKIN, DRY, W/GLOVES

## (undated) DEVICE — SOLUTION, IRRIGATION, USP, SODIUM CHLORIDE 0.9%, 3000 ML, BAG

## (undated) DEVICE — Device

## (undated) DEVICE — COLLECTION BAG, FLUID, NON-STERILE

## (undated) DEVICE — BAG, PRESSURE INFUSER, 3000 CC, LF

## (undated) DEVICE — TOWEL PACK, STERILE, 4/PACK, BLUE

## (undated) DEVICE — IRRIGATION SET, CYSTOSCOPY, TURP, Y, CONTINUOUS, 81 IN

## (undated) DEVICE — BASKET, URETERAL, STONE RETRIEVAL, N-CIRCLE, NITNOL